# Patient Record
Sex: FEMALE | Race: WHITE | Employment: FULL TIME | URBAN - METROPOLITAN AREA
[De-identification: names, ages, dates, MRNs, and addresses within clinical notes are randomized per-mention and may not be internally consistent; named-entity substitution may affect disease eponyms.]

---

## 2018-08-03 ENCOUNTER — OFFICE VISIT (OUTPATIENT)
Dept: VASCULAR SURGERY | Age: 53
End: 2018-08-03

## 2018-08-03 VITALS
DIASTOLIC BLOOD PRESSURE: 55 MMHG | WEIGHT: 125 LBS | BODY MASS INDEX: 19.62 KG/M2 | HEIGHT: 67 IN | SYSTOLIC BLOOD PRESSURE: 88 MMHG | HEART RATE: 68 BPM

## 2018-08-03 DIAGNOSIS — I83.892 SYMPTOMATIC VARICOSE VEINS, LEFT: Primary | ICD-10-CM

## 2018-08-03 PROCEDURE — 99213 OFFICE O/P EST LOW 20 MIN: CPT | Performed by: SURGERY

## 2018-08-08 ENCOUNTER — PROCEDURE VISIT (OUTPATIENT)
Dept: VASCULAR SURGERY | Age: 53
End: 2018-08-08

## 2018-08-08 DIAGNOSIS — I83.12 VARICOSE VEINS OF LEFT LOWER EXTREMITY WITH INFLAMMATION: Primary | ICD-10-CM

## 2018-08-08 PROCEDURE — 93971 EXTREMITY STUDY: CPT | Performed by: SURGERY

## 2018-12-12 ENCOUNTER — OFFICE VISIT (OUTPATIENT)
Dept: VASCULAR SURGERY | Age: 53
End: 2018-12-12
Payer: COMMERCIAL

## 2018-12-12 ENCOUNTER — TELEPHONE (OUTPATIENT)
Dept: VASCULAR SURGERY | Age: 53
End: 2018-12-12

## 2018-12-12 VITALS
HEIGHT: 67 IN | DIASTOLIC BLOOD PRESSURE: 70 MMHG | WEIGHT: 125 LBS | BODY MASS INDEX: 19.62 KG/M2 | SYSTOLIC BLOOD PRESSURE: 106 MMHG | HEART RATE: 51 BPM

## 2018-12-12 DIAGNOSIS — I83.90 RECURRENT VARICOSE VEINS: ICD-10-CM

## 2018-12-12 DIAGNOSIS — I83.892 SYMPTOMATIC VARICOSE VEINS, LEFT: Primary | ICD-10-CM

## 2018-12-12 PROCEDURE — 99213 OFFICE O/P EST LOW 20 MIN: CPT | Performed by: SURGERY

## 2019-05-31 ENCOUNTER — TELEPHONE (OUTPATIENT)
Dept: VASCULAR SURGERY | Age: 54
End: 2019-05-31

## 2019-05-31 NOTE — TELEPHONE ENCOUNTER
Patient had a scan done in August last year and saw Dr. Juan A Ramos in December. She said she didn't know what the next steps were and no one called her. Please call her at [phone]. Thanks.   LA

## 2019-06-03 ENCOUNTER — TELEPHONE (OUTPATIENT)
Dept: VASCULAR SURGERY | Age: 54
End: 2019-06-03

## 2019-06-03 NOTE — TELEPHONE ENCOUNTER
Spoke with patient - explained there was some miscommunication and she was to F/U with office after her Pelvic U/S in 12/2018. However, she did not. I apologized that I did not follow up with her either. This was well received by the patient. Did explain that I can talk with Dr. Yumiko Hammer on Wednesday 6/5/19 and call her back to discuss next steps -possibly another VDS & Test result appointment. Also, patient asked about B/LE VDS since she is not experiencing R/LE S/S around her ankle. Will discuss w/Dr. Da Obando and call patient.   Nara Varghese 6/3/19

## 2019-08-23 ENCOUNTER — PROCEDURE VISIT (OUTPATIENT)
Dept: VASCULAR SURGERY | Age: 54
End: 2019-08-23
Payer: COMMERCIAL

## 2019-08-23 ENCOUNTER — OFFICE VISIT (OUTPATIENT)
Dept: VASCULAR SURGERY | Age: 54
End: 2019-08-23
Payer: COMMERCIAL

## 2019-08-23 VITALS
HEIGHT: 67 IN | SYSTOLIC BLOOD PRESSURE: 120 MMHG | WEIGHT: 132 LBS | BODY MASS INDEX: 20.72 KG/M2 | DIASTOLIC BLOOD PRESSURE: 85 MMHG

## 2019-08-23 VITALS — DIASTOLIC BLOOD PRESSURE: 85 MMHG | SYSTOLIC BLOOD PRESSURE: 120 MMHG | WEIGHT: 132 LBS | BODY MASS INDEX: 20.67 KG/M2

## 2019-08-23 DIAGNOSIS — I83.11 VARICOSE VEINS OF BOTH LOWER EXTREMITIES WITH INFLAMMATION: Primary | ICD-10-CM

## 2019-08-23 DIAGNOSIS — I83.12 VARICOSE VEINS OF BOTH LOWER EXTREMITIES WITH INFLAMMATION: Primary | ICD-10-CM

## 2019-08-23 DIAGNOSIS — I83.90 RECURRENT VARICOSE VEINS: ICD-10-CM

## 2019-08-23 DIAGNOSIS — I83.892 SYMPTOMATIC VARICOSE VEINS, LEFT: Primary | ICD-10-CM

## 2019-08-23 PROCEDURE — 93970 EXTREMITY STUDY: CPT | Performed by: SURGERY

## 2019-08-23 PROCEDURE — 99213 OFFICE O/P EST LOW 20 MIN: CPT | Performed by: SURGERY

## 2019-08-28 ENCOUNTER — TELEPHONE (OUTPATIENT)
Dept: VASCULAR SURGERY | Age: 54
End: 2019-08-28

## 2019-08-28 NOTE — TELEPHONE ENCOUNTER
Mailed 20-30 mmHg TH compression hose Rx out to patient today along with a list of vendors.   Explained at last OV that she may need 15-20 compression hose post op.  Irl Melcroft 8/28/19

## 2019-09-11 ENCOUNTER — TELEPHONE (OUTPATIENT)
Dept: VASCULAR SURGERY | Age: 54
End: 2019-09-11

## 2019-09-11 NOTE — TELEPHONE ENCOUNTER
9/11/19 - spoke with Montserrat @ [facility] - gave the needed info regarding compression hose wear and reflux of the L LSV @ [facility] (none). And explained that all of this information was previously submitted with original authorization request which Danette Gardiner stated she could see and did not understand why this is being requested.  Rob Stockton 9/11/19

## 2019-10-02 ENCOUNTER — TELEPHONE (OUTPATIENT)
Dept: CARDIOTHORACIC SURGERY | Age: 54
End: 2019-10-02

## 2020-01-17 ENCOUNTER — TELEPHONE (OUTPATIENT)
Dept: CARDIOTHORACIC SURGERY | Age: 55
End: 2020-01-17

## 2020-09-22 ENCOUNTER — PROCEDURE VISIT (OUTPATIENT)
Dept: VASCULAR SURGERY | Age: 55
End: 2020-09-22
Payer: COMMERCIAL

## 2020-09-22 DIAGNOSIS — I83.892 SYMPTOMATIC VARICOSE VEINS, LEFT: Primary | ICD-10-CM

## 2020-09-22 PROCEDURE — 93971 EXTREMITY STUDY: CPT | Performed by: SURGERY

## 2020-11-12 ENCOUNTER — OFFICE VISIT (OUTPATIENT)
Dept: VASCULAR SURGERY | Age: 55
End: 2020-11-12
Payer: COMMERCIAL

## 2020-11-12 VITALS
BODY MASS INDEX: 20.72 KG/M2 | DIASTOLIC BLOOD PRESSURE: 68 MMHG | HEART RATE: 46 BPM | TEMPERATURE: 98 F | HEIGHT: 67 IN | SYSTOLIC BLOOD PRESSURE: 106 MMHG | WEIGHT: 132 LBS

## 2020-11-12 PROCEDURE — 99214 OFFICE O/P EST MOD 30 MIN: CPT | Performed by: SURGERY

## 2020-11-12 ASSESSMENT — ENCOUNTER SYMPTOMS
RESPIRATORY NEGATIVE: 1
EYES NEGATIVE: 1
GASTROINTESTINAL NEGATIVE: 1
ALLERGIC/IMMUNOLOGIC NEGATIVE: 1

## 2020-11-12 NOTE — PROGRESS NOTES
Subjective:      Patient ID: Sanchez Martinez is a 48 y.o. female. HPI Long standing pt last seen in 2019. Surgeryrepeatedly recommended but pt never proceeded for multiple reasons. Returns now for reassessment - c/o L leg fatgue, heaviness and achy discomfort. Worsening. Not wearing any compression. No treatment since seen last.       History reviewed. No pertinent past medical history. Past Surgical History:   Procedure Laterality Date    CARDIAC VALVE REPLACEMENT      valve repair     SECTION       No family history on file. History reviewed. No pertinent past medical history. No Known Allergies  Current Outpatient Medications   Medication Sig Dispense Refill    bisoprolol (ZEBETA) 5 MG tablet       levothyroxine (SYNTHROID) 50 MCG tablet        No current facility-administered medications for this visit.       Social History     Socioeconomic History    Marital status:      Spouse name: Not on file    Number of children: Not on file    Years of education: Not on file    Highest education level: Not on file   Occupational History    Not on file   Social Needs    Financial resource strain: Not on file    Food insecurity     Worry: Not on file     Inability: Not on file    Transportation needs     Medical: Not on file     Non-medical: Not on file   Tobacco Use    Smoking status: Never Smoker    Smokeless tobacco: Never Used   Substance and Sexual Activity    Alcohol use: No    Drug use: Not on file    Sexual activity: Not on file   Lifestyle    Physical activity     Days per week: Not on file     Minutes per session: Not on file    Stress: Not on file   Relationships    Social connections     Talks on phone: Not on file     Gets together: Not on file     Attends Yarsani service: Not on file     Active member of club or organization: Not on file     Attends meetings of clubs or organizations: Not on file     Relationship status: Not on file    Intimate partner violence Fear of current or ex partner: Not on file     Emotionally abused: Not on file     Physically abused: Not on file     Forced sexual activity: Not on file   Other Topics Concern    Not on file   Social History Narrative    Not on file     No family history on file. Review of Systems   Constitutional: Negative. HENT: Negative. Eyes: Negative. Respiratory: Negative. Cardiovascular: Positive for palpitations and leg swelling. L/LE   Gastrointestinal: Negative. Endocrine: Negative. Genitourinary: Negative. Musculoskeletal: Negative. Allergic/Immunologic: Negative. Neurological: Negative. Hematological: Bruises/bleeds easily. Psychiatric/Behavioral: Negative. Objective:   Physical Exam   Constitutional: She is oriented to person, place, and time. She appears well-developed and well-nourished. Thin - athletic   HENT:   Head: Normocephalic and atraumatic. Eyes: Conjunctivae and EOM are normal.   Neck: Normal range of motion. Neck supple. No JVD present. No tracheal deviation present. No thyromegaly present. Cardiovascular: Normal rate, regular rhythm, normal heart sounds and intact distal pulses. Pulmonary/Chest: Effort normal and breath sounds normal.   Abdominal: Soft. Bowel sounds are normal. She exhibits no mass. Musculoskeletal:         General: No edema. Lymphadenopathy:     She has no cervical adenopathy. Neurological: She is alert and oriented to person, place, and time. Skin: Skin is warm and dry. Psychiatric: She has a normal mood and affect. Her behavior is normal. Judgment and thought content normal.   Nursing note and vitals reviewed.     R size  L size     Spider  telangiectasias       Reticular veins       Varicose   veins Groin,thigh,calf 8-10 mm     VRS 9/22/2020 No deep reflux; L SFJ reflux + BK GSV reflux (likely posterior arch remnant) + L LSV reflux    Assessment:      Chronic superficial venous insufficiency L leg with recurrent symptomatic varicose veins      Plan:      SIGNIFICANT LEFT JUNCTION & AXIAL REFLUX with LARGE RECURRENT VARICOSITIES. Surgery is recommended - R SFJ lig/divivison + L BKGSV RFA + L LSV RFA + stab phlebectomies. This was discussed in terms that the patient could understand. The risks, including bleeding, clotting, bruising, swelling, neuritis, skin dimpling, infection, pigmentation, scarring, and mortality were discussed. I answered all questions pertaining to surgery and post operative expectations related to return to work and daily activity. Time spent counseling and coordination of care: 25 minutes. More than 50% of visit was spent reviewing and discussing venous duplex scan. She will continue compression stockings and staff to contact to schedule as recommended if desired.

## 2021-01-04 ENCOUNTER — TELEPHONE (OUTPATIENT)
Dept: VASCULAR SURGERY | Age: 56
End: 2021-01-04

## 2021-02-09 ENCOUNTER — PREP FOR PROCEDURE (OUTPATIENT)
Dept: VASCULAR SURGERY | Age: 56
End: 2021-02-09

## 2021-02-09 DIAGNOSIS — I87.2 CHRONIC VENOUS INSUFFICIENCY: ICD-10-CM

## 2021-02-09 DIAGNOSIS — Z01.818 PREOP TESTING: Primary | ICD-10-CM

## 2021-02-10 RX ORDER — SODIUM CHLORIDE 0.9 % (FLUSH) 0.9 %
10 SYRINGE (ML) INJECTION PRN
Status: CANCELLED | OUTPATIENT
Start: 2021-02-10

## 2021-02-10 RX ORDER — SODIUM CHLORIDE 0.9 % (FLUSH) 0.9 %
10 SYRINGE (ML) INJECTION EVERY 12 HOURS SCHEDULED
Status: CANCELLED | OUTPATIENT
Start: 2021-02-10

## 2021-02-18 ENCOUNTER — OFFICE VISIT (OUTPATIENT)
Dept: PRIMARY CARE CLINIC | Age: 56
End: 2021-02-18
Payer: COMMERCIAL

## 2021-02-18 DIAGNOSIS — Z01.818 PREOP EXAMINATION: Primary | ICD-10-CM

## 2021-02-18 LAB — SARS-COV-2, NAA: NOT DETECTED

## 2021-02-18 PROCEDURE — 99211 OFF/OP EST MAY X REQ PHY/QHP: CPT | Performed by: NURSE PRACTITIONER

## 2021-02-18 NOTE — PROGRESS NOTES
[facility] [facility] time____0900________        Surgery time___1030_________    Take the following medications with a sip of water: Follow your MD/Surgeons pre-procedure instructions regarding your medications    Do not eat or drink anything after 12:00 midnight prior to your surgery. This includes water chewing gum, mints and ice chips. You may brush your teeth and gargle the morning of your surgery, but do not swallow the water     Please see your family doctor/pediatrician for a history and physical and/or concerning medications. Bring any test results/reports from your physicians office. If you are under the care of a heart doctor or specialist doctor, please be aware that you may be asked to them for clearance    You may be asked to stop blood thinners such as Coumadin, Plavix, Fragmin, Lovenox, etc., or any anti-inflammatories such as:  Aspirin, Ibuprofen, Advil, Naproxen prior to your surgery. We also ask that you stop any OTC medications such as fish oil, vitamin E, glucosamine, garlic, Multivitamins, COQ 10, etc.    We ask that you do not smoke 24 hours prior to surgery  We ask that you do not  drink any alcoholic beverages 24 hours prior to surgery     You must make arrangements for a responsible adult to take you home after your surgery. For your safety you will not be allowed to leave alone or drive yourself home. Your surgery will be cancelled if you do not have a ride home. Also for your safety, it is strongly suggested that someone stay with you the first 24 hours after your surgery. A parent or legal guardian must accompany a child scheduled for surgery and plan to stay at the hospital until the child is discharged. Please do not bring other children with you. For your comfort, please wear simple loose fitting clothing to the hospital.  Please do not bring valuables.     Do not wear any make-up or nail polish on your fingers or

## 2021-02-18 NOTE — PROGRESS NOTES
Phone message left on home/cell # , to call PAT dept at [phone]  for history review and surgery instructions.

## 2021-02-18 NOTE — PROGRESS NOTES

## 2021-02-18 NOTE — PROGRESS NOTES
C-Difficile admission screening and protocol:     * Admitted with diarrhea? YES____    NO__X___     *Prior history of C-Diff. In last 3 months? YES____   NO__X__     *Antibiotic use in the past 6-8 weeks? NO___X___YES______                 If yes which  ANTIBIOTIC AND REASON______     *Prior hospitalization or nursing home in the last month?  YES____   NO_X__

## 2021-02-22 ENCOUNTER — ANESTHESIA EVENT (OUTPATIENT)
Dept: OPERATING ROOM | Age: 56
End: 2021-02-22
Payer: COMMERCIAL

## 2021-02-23 ENCOUNTER — HOSPITAL ENCOUNTER (OUTPATIENT)
Age: 56
Setting detail: OUTPATIENT SURGERY
Discharge: HOME | End: 2021-02-23
Attending: SURGERY | Admitting: SURGERY
Payer: COMMERCIAL

## 2021-02-23 ENCOUNTER — ANESTHESIA (OUTPATIENT)
Dept: OPERATING ROOM | Age: 56
End: 2021-02-23
Payer: COMMERCIAL

## 2021-02-23 VITALS
BODY MASS INDEX: 20.28 KG/M2 | DIASTOLIC BLOOD PRESSURE: 60 MMHG | OXYGEN SATURATION: 99 % | RESPIRATION RATE: 16 BRPM | SYSTOLIC BLOOD PRESSURE: 121 MMHG | HEART RATE: 42 BPM | TEMPERATURE: 97.4 F | HEIGHT: 67 IN | WEIGHT: 129.19 LBS

## 2021-02-23 VITALS
TEMPERATURE: 94.3 F | DIASTOLIC BLOOD PRESSURE: 76 MMHG | OXYGEN SATURATION: 100 % | RESPIRATION RATE: 1 BRPM | SYSTOLIC BLOOD PRESSURE: 120 MMHG

## 2021-02-23 DIAGNOSIS — G89.18 POSTOPERATIVE PAIN OF EXTREMITY: Primary | ICD-10-CM

## 2021-02-23 DIAGNOSIS — M79.609 POSTOPERATIVE PAIN OF EXTREMITY: Primary | ICD-10-CM

## 2021-02-23 PROCEDURE — 7100000011 HC PHASE II RECOVERY - ADDTL 15 MIN: Performed by: SURGERY

## 2021-02-23 PROCEDURE — 6360000002 HC RX W HCPCS: Performed by: SURGERY

## 2021-02-23 PROCEDURE — 2709999900 HC NON-CHARGEABLE SUPPLY: Performed by: SURGERY

## 2021-02-23 PROCEDURE — 3600000014 HC SURGERY LEVEL 4 ADDTL 15MIN: Performed by: SURGERY

## 2021-02-23 PROCEDURE — 36475 ENDOVENOUS RF 1ST VEIN: CPT | Performed by: SURGERY

## 2021-02-23 PROCEDURE — 3700000001 HC ADD 15 MINUTES (ANESTHESIA): Performed by: SURGERY

## 2021-02-23 PROCEDURE — 37766 PHLEB VEINS - EXTREM 20+: CPT | Performed by: SURGERY

## 2021-02-23 PROCEDURE — 7100000000 HC PACU RECOVERY - FIRST 15 MIN: Performed by: SURGERY

## 2021-02-23 PROCEDURE — C1769 GUIDE WIRE: HCPCS | Performed by: SURGERY

## 2021-02-23 PROCEDURE — 3700000000 HC ANESTHESIA ATTENDED CARE: Performed by: SURGERY

## 2021-02-23 PROCEDURE — 6360000002 HC RX W HCPCS: Performed by: NURSE ANESTHETIST, CERTIFIED REGISTERED

## 2021-02-23 PROCEDURE — 2500000003 HC RX 250 WO HCPCS: Performed by: NURSE ANESTHETIST, CERTIFIED REGISTERED

## 2021-02-23 PROCEDURE — 2580000003 HC RX 258: Performed by: SURGERY

## 2021-02-23 PROCEDURE — 7100000001 HC PACU RECOVERY - ADDTL 15 MIN: Performed by: SURGERY

## 2021-02-23 PROCEDURE — 7100000010 HC PHASE II RECOVERY - FIRST 15 MIN: Performed by: SURGERY

## 2021-02-23 PROCEDURE — 2580000003 HC RX 258: Performed by: ANESTHESIOLOGY

## 2021-02-23 PROCEDURE — 6360000002 HC RX W HCPCS: Performed by: ANESTHESIOLOGY

## 2021-02-23 PROCEDURE — 37700 LIGATION&DIV LONG SAPH VEIN: CPT | Performed by: SURGERY

## 2021-02-23 PROCEDURE — 2720000010 HC SURG SUPPLY STERILE: Performed by: SURGERY

## 2021-02-23 PROCEDURE — C1894 INTRO/SHEATH, NON-LASER: HCPCS | Performed by: SURGERY

## 2021-02-23 PROCEDURE — 2500000003 HC RX 250 WO HCPCS: Performed by: SURGERY

## 2021-02-23 PROCEDURE — 3600000004 HC SURGERY LEVEL 4 BASE: Performed by: SURGERY

## 2021-02-23 RX ORDER — OXYCODONE HYDROCHLORIDE AND ACETAMINOPHEN 5; 325 MG/1; MG/1
2 TABLET ORAL PRN
Status: DISCONTINUED | OUTPATIENT
Start: 2021-02-23 | End: 2021-02-23 | Stop reason: HOSPADM

## 2021-02-23 RX ORDER — ONDANSETRON 2 MG/ML
INJECTION INTRAMUSCULAR; INTRAVENOUS PRN
Status: DISCONTINUED | OUTPATIENT
Start: 2021-02-23 | End: 2021-02-23 | Stop reason: SDUPTHER

## 2021-02-23 RX ORDER — PHENYLEPHRINE HCL IN 0.9% NACL 1 MG/10 ML
SYRINGE (ML) INTRAVENOUS PRN
Status: DISCONTINUED | OUTPATIENT
Start: 2021-02-23 | End: 2021-02-23 | Stop reason: SDUPTHER

## 2021-02-23 RX ORDER — LIDOCAINE HYDROCHLORIDE 20 MG/ML
INJECTION, SOLUTION EPIDURAL; INFILTRATION; INTRACAUDAL; PERINEURAL PRN
Status: DISCONTINUED | OUTPATIENT
Start: 2021-02-23 | End: 2021-02-23 | Stop reason: SDUPTHER

## 2021-02-23 RX ORDER — OXYCODONE HYDROCHLORIDE AND ACETAMINOPHEN 5; 325 MG/1; MG/1
1 TABLET ORAL PRN
Status: DISCONTINUED | OUTPATIENT
Start: 2021-02-23 | End: 2021-02-23 | Stop reason: HOSPADM

## 2021-02-23 RX ORDER — NEOSTIGMINE METHYLSULFATE 1 MG/ML
INJECTION, SOLUTION INTRAVENOUS PRN
Status: DISCONTINUED | OUTPATIENT
Start: 2021-02-23 | End: 2021-02-23 | Stop reason: SDUPTHER

## 2021-02-23 RX ORDER — PROPOFOL 10 MG/ML
INJECTION, EMULSION INTRAVENOUS PRN
Status: DISCONTINUED | OUTPATIENT
Start: 2021-02-23 | End: 2021-02-23 | Stop reason: SDUPTHER

## 2021-02-23 RX ORDER — ROCURONIUM BROMIDE 10 MG/ML
INJECTION, SOLUTION INTRAVENOUS PRN
Status: DISCONTINUED | OUTPATIENT
Start: 2021-02-23 | End: 2021-02-23 | Stop reason: SDUPTHER

## 2021-02-23 RX ORDER — SODIUM CHLORIDE 0.9 % (FLUSH) 0.9 %
10 SYRINGE (ML) INJECTION EVERY 12 HOURS SCHEDULED
Status: DISCONTINUED | OUTPATIENT
Start: 2021-02-23 | End: 2021-02-23 | Stop reason: HOSPADM

## 2021-02-23 RX ORDER — FENTANYL CITRATE 50 UG/ML
INJECTION, SOLUTION INTRAMUSCULAR; INTRAVENOUS PRN
Status: DISCONTINUED | OUTPATIENT
Start: 2021-02-23 | End: 2021-02-23 | Stop reason: SDUPTHER

## 2021-02-23 RX ORDER — APREPITANT 40 MG/1
40 CAPSULE ORAL ONCE
Status: COMPLETED | OUTPATIENT
Start: 2021-02-23 | End: 2021-02-23

## 2021-02-23 RX ORDER — GLYCOPYRROLATE 0.2 MG/ML
INJECTION INTRAMUSCULAR; INTRAVENOUS PRN
Status: DISCONTINUED | OUTPATIENT
Start: 2021-02-23 | End: 2021-02-23 | Stop reason: SDUPTHER

## 2021-02-23 RX ORDER — SODIUM CHLORIDE 0.9 % (FLUSH) 0.9 %
10 SYRINGE (ML) INJECTION PRN
Status: DISCONTINUED | OUTPATIENT
Start: 2021-02-23 | End: 2021-02-23 | Stop reason: HOSPADM

## 2021-02-23 RX ORDER — SODIUM CHLORIDE 9 MG/ML
INJECTION, SOLUTION INTRAVENOUS CONTINUOUS
Status: DISCONTINUED | OUTPATIENT
Start: 2021-02-23 | End: 2021-02-23 | Stop reason: HOSPADM

## 2021-02-23 RX ORDER — FENTANYL CITRATE 50 UG/ML
25 INJECTION, SOLUTION INTRAMUSCULAR; INTRAVENOUS EVERY 5 MIN PRN
Status: DISCONTINUED | OUTPATIENT
Start: 2021-02-23 | End: 2021-02-23 | Stop reason: HOSPADM

## 2021-02-23 RX ORDER — ONDANSETRON 2 MG/ML
4 INJECTION INTRAMUSCULAR; INTRAVENOUS
Status: COMPLETED | OUTPATIENT
Start: 2021-02-23 | End: 2021-02-23

## 2021-02-23 RX ORDER — MIDAZOLAM HYDROCHLORIDE 1 MG/ML
INJECTION INTRAMUSCULAR; INTRAVENOUS PRN
Status: DISCONTINUED | OUTPATIENT
Start: 2021-02-23 | End: 2021-02-23 | Stop reason: SDUPTHER

## 2021-02-23 RX ORDER — EPHEDRINE SULFATE/0.9% NACL/PF 50 MG/5 ML
SYRINGE (ML) INTRAVENOUS PRN
Status: DISCONTINUED | OUTPATIENT
Start: 2021-02-23 | End: 2021-02-23 | Stop reason: SDUPTHER

## 2021-02-23 RX ORDER — CEPHALEXIN 500 MG/1
500 CAPSULE ORAL 3 TIMES DAILY
Qty: 15 CAPSULE | Refills: 0 | Status: SHIPPED | OUTPATIENT
Start: 2021-02-23

## 2021-02-23 RX ORDER — DEXAMETHASONE SODIUM PHOSPHATE 4 MG/ML
INJECTION, SOLUTION INTRA-ARTICULAR; INTRALESIONAL; INTRAMUSCULAR; INTRAVENOUS; SOFT TISSUE PRN
Status: DISCONTINUED | OUTPATIENT
Start: 2021-02-23 | End: 2021-02-23 | Stop reason: SDUPTHER

## 2021-02-23 RX ORDER — HYDROCODONE BITARTRATE AND ACETAMINOPHEN 5; 325 MG/1; MG/1
1 TABLET ORAL EVERY 6 HOURS PRN
Qty: 20 TABLET | Refills: 0 | Status: SHIPPED | OUTPATIENT
Start: 2021-02-23 | End: 2021-02-28

## 2021-02-23 RX ADMIN — LIDOCAINE HYDROCHLORIDE 60 MG: 20 INJECTION, SOLUTION EPIDURAL; INFILTRATION; INTRACAUDAL; PERINEURAL at 11:23

## 2021-02-23 RX ADMIN — FENTANYL CITRATE 25 MCG: 50 INJECTION INTRAMUSCULAR; INTRAVENOUS at 12:29

## 2021-02-23 RX ADMIN — Medication 5 MG: at 11:41

## 2021-02-23 RX ADMIN — GLYCOPYRROLATE 0.2 MG: 0.2 INJECTION, SOLUTION INTRAMUSCULAR; INTRAVENOUS at 11:51

## 2021-02-23 RX ADMIN — ONDANSETRON 4 MG: 2 INJECTION INTRAMUSCULAR; INTRAVENOUS at 14:19

## 2021-02-23 RX ADMIN — FENTANYL CITRATE 25 MCG: 50 INJECTION INTRAMUSCULAR; INTRAVENOUS at 12:02

## 2021-02-23 RX ADMIN — APREPITANT 40 MG: 40 CAPSULE ORAL at 10:26

## 2021-02-23 RX ADMIN — Medication 5 MG: at 11:34

## 2021-02-23 RX ADMIN — ROCURONIUM BROMIDE 50 MG: 10 INJECTION INTRAVENOUS at 11:23

## 2021-02-23 RX ADMIN — DEXAMETHASONE SODIUM PHOSPHATE 6 MG: 4 INJECTION, SOLUTION INTRAMUSCULAR; INTRAVENOUS at 11:31

## 2021-02-23 RX ADMIN — CEFAZOLIN SODIUM 2000 MG: 10 INJECTION, POWDER, FOR SOLUTION INTRAVENOUS at 11:16

## 2021-02-23 RX ADMIN — Medication 5 MG: at 13:17

## 2021-02-23 RX ADMIN — SODIUM CHLORIDE: 9 INJECTION, SOLUTION INTRAVENOUS at 10:02

## 2021-02-23 RX ADMIN — PROPOFOL 150 MG: 10 INJECTION, EMULSION INTRAVENOUS at 11:23

## 2021-02-23 RX ADMIN — NEOSTIGMINE 3 MG: 1 INJECTION INTRAVENOUS at 14:22

## 2021-02-23 RX ADMIN — Medication 10 MG: at 11:45

## 2021-02-23 RX ADMIN — Medication 50 MCG: at 11:48

## 2021-02-23 RX ADMIN — FENTANYL CITRATE 50 MCG: 50 INJECTION INTRAMUSCULAR; INTRAVENOUS at 11:21

## 2021-02-23 RX ADMIN — PROPOFOL 30 MG: 10 INJECTION, EMULSION INTRAVENOUS at 12:35

## 2021-02-23 RX ADMIN — Medication 5 MG: at 11:43

## 2021-02-23 RX ADMIN — ROCURONIUM BROMIDE 20 MG: 10 INJECTION INTRAVENOUS at 12:35

## 2021-02-23 RX ADMIN — GLYCOPYRROLATE 0.4 MG: 0.2 INJECTION, SOLUTION INTRAMUSCULAR; INTRAVENOUS at 14:21

## 2021-02-23 RX ADMIN — ONDANSETRON 4 MG: 2 INJECTION INTRAMUSCULAR; INTRAVENOUS at 15:40

## 2021-02-23 RX ADMIN — FENTANYL CITRATE 25 MCG: 50 INJECTION, SOLUTION INTRAMUSCULAR; INTRAVENOUS at 15:05

## 2021-02-23 RX ADMIN — Medication 10 MG: at 13:33

## 2021-02-23 RX ADMIN — MIDAZOLAM 2 MG: 1 INJECTION INTRAMUSCULAR; INTRAVENOUS at 11:16

## 2021-02-23 RX ADMIN — Medication 10 MG: at 13:46

## 2021-02-23 ASSESSMENT — PULMONARY FUNCTION TESTS
PIF_VALUE: 17
PIF_VALUE: 18
PIF_VALUE: 2
PIF_VALUE: 17
PIF_VALUE: 17
PIF_VALUE: 18
PIF_VALUE: 17
PIF_VALUE: 18
PIF_VALUE: 16
PIF_VALUE: 17
PIF_VALUE: 2
PIF_VALUE: 18
PIF_VALUE: 17
PIF_VALUE: 18
PIF_VALUE: 17
PIF_VALUE: 18
PIF_VALUE: 17
PIF_VALUE: 0
PIF_VALUE: 17
PIF_VALUE: 14
PIF_VALUE: 18
PIF_VALUE: 17
PIF_VALUE: 18
PIF_VALUE: 14
PIF_VALUE: 16
PIF_VALUE: 18
PIF_VALUE: 17
PIF_VALUE: 13
PIF_VALUE: 13
PIF_VALUE: 14
PIF_VALUE: 17
PIF_VALUE: 16
PIF_VALUE: 17
PIF_VALUE: 18
PIF_VALUE: 2
PIF_VALUE: 17
PIF_VALUE: 16
PIF_VALUE: 1
PIF_VALUE: 17
PIF_VALUE: 13
PIF_VALUE: 17
PIF_VALUE: 3
PIF_VALUE: 0
PIF_VALUE: 14
PIF_VALUE: 17
PIF_VALUE: 19
PIF_VALUE: 18
PIF_VALUE: 14
PIF_VALUE: 18
PIF_VALUE: 16
PIF_VALUE: 14
PIF_VALUE: 17
PIF_VALUE: 1
PIF_VALUE: 1
PIF_VALUE: 18
PIF_VALUE: 17
PIF_VALUE: 17
PIF_VALUE: 14
PIF_VALUE: 18
PIF_VALUE: 17
PIF_VALUE: 1
PIF_VALUE: 18
PIF_VALUE: 2
PIF_VALUE: 17
PIF_VALUE: 17
PIF_VALUE: 3
PIF_VALUE: 13
PIF_VALUE: 14
PIF_VALUE: 16
PIF_VALUE: 14
PIF_VALUE: 13
PIF_VALUE: 17
PIF_VALUE: 1
PIF_VALUE: 14
PIF_VALUE: 17
PIF_VALUE: 18
PIF_VALUE: 14
PIF_VALUE: 17
PIF_VALUE: 14
PIF_VALUE: 18
PIF_VALUE: 17
PIF_VALUE: 13
PIF_VALUE: 17
PIF_VALUE: 14
PIF_VALUE: 24
PIF_VALUE: 0
PIF_VALUE: 16
PIF_VALUE: 17

## 2021-02-23 ASSESSMENT — PAIN SCALES - GENERAL
PAINLEVEL_OUTOF10: 6
PAINLEVEL_OUTOF10: 3
PAINLEVEL_OUTOF10: 3
PAINLEVEL_OUTOF10: 5

## 2021-02-23 ASSESSMENT — PAIN DESCRIPTION - ORIENTATION
ORIENTATION: LEFT
ORIENTATION: LEFT

## 2021-02-23 ASSESSMENT — PAIN - FUNCTIONAL ASSESSMENT
PAIN_FUNCTIONAL_ASSESSMENT: ACTIVITIES ARE NOT PREVENTED
PAIN_FUNCTIONAL_ASSESSMENT: ACTIVITIES ARE NOT PREVENTED
PAIN_FUNCTIONAL_ASSESSMENT: 0-10

## 2021-02-23 ASSESSMENT — PAIN DESCRIPTION - PROGRESSION
CLINICAL_PROGRESSION: GRADUALLY IMPROVING
CLINICAL_PROGRESSION: NOT CHANGED

## 2021-02-23 ASSESSMENT — PAIN DESCRIPTION - DESCRIPTORS
DESCRIPTORS: BURNING

## 2021-02-23 ASSESSMENT — PAIN DESCRIPTION - LOCATION
LOCATION: GROIN;LEG
LOCATION: LEG
LOCATION: LEG
LOCATION: GROIN

## 2021-02-23 ASSESSMENT — PAIN DESCRIPTION - ONSET
ONSET: ON-GOING
ONSET: ON-GOING

## 2021-02-23 ASSESSMENT — PAIN DESCRIPTION - PAIN TYPE: TYPE: SURGICAL PAIN

## 2021-02-23 ASSESSMENT — PAIN DESCRIPTION - FREQUENCY: FREQUENCY: CONTINUOUS

## 2021-02-23 NOTE — H&P
Update History & Physical    The patient's History and Physical of February 11, 2021 was reviewed with the patient and I examined the patient. There was no change. The surgical site was confirmed by the patient and me. Plan: The risks, benefits, expected outcome, and alternative to the recommended procedure have been discussed with the patient. Patient understands and wants to proceed with the procedure.      Electronically signed by Osvaldo Peraza MD on 2/23/2021 at 11:06 AM

## 2021-02-23 NOTE — ANESTHESIA POSTPROCEDURE EVALUATION
Department of Anesthesiology  Postprocedure Note    Patient: Molly Patel  MRN: [id number]  YOB: 1965  Date of evaluation: 2/23/2021  Time:  3:40 PM     Procedure Summary     Date: 02/23/21 Room / Location: [address]    Anesthesia Start: 1118 Anesthesia Stop: 1445    Procedures:       LEFT LIGATION SAPHENFEMORAL LIGATION (Right Groin)      LEFT BELOW KNEE ENDOVENOUS RADIOFREQUENCY ABLATION OF GREATER SAPHENOUS VEIN, LEFT ENDOVENOUS RADIOFREQUENCY ABLATION LESSER SAPHENOUS VEIN, STAB PHLEBECTOMIES (Left Leg Lower) Diagnosis:       Symptomatic varicose veins, bilateral      (symptomatic varicose veins, recurrent varicose veins)    Surgeons: Cliff Tran MD Responsible Provider: Pablito Steele MD    Anesthesia Type: general ASA Status: 3          Anesthesia Type: general    Marcos Phase I: Marcos Score: 10    Marcos Phase II:      Last vitals: Reviewed and per EMR flowsheets.        Anesthesia Post Evaluation    Patient location during evaluation: bedside  Patient participation: complete - patient participated  Level of consciousness: awake  Pain score: 3  Airway patency: patent  Nausea & Vomiting: no nausea and no vomiting  Complications: no  Cardiovascular status: blood pressure returned to baseline  Respiratory status: acceptable  Hydration status: euvolemic

## 2021-02-23 NOTE — PROGRESS NOTES
Pt to PACU. A&Ox4. VSS, HR running low 40's which is her baseline. LLE dressing D&I. Normal sensation, brisk cap refill, normal distal pulses. Left groin dressing D&I. IV site WDL.

## 2021-02-23 NOTE — BRIEF OP NOTE
Brief Postoperative Note      Patient: Kiersten Sage  YOB: 1965  MRN: [id number]    Date of Procedure: 2/23/2021    Pre-Op Diagnosis: symptomatic varicose veins, recurrent varicose veins    Post-Op Diagnosis: Same       Procedure: Ligation/division L SFJ; L LSV RFA; stab phlebectomies L leg    Surgeon(s):  Valentina Henry MD    Assistant:  Surgical Assistant: Patricia Britt    Anesthesia: General    Estimated Blood Loss (mL): less than 50     Complications: None    Specimens:   * No specimens in log *    Implants:  * No implants in log *      Drains: * No LDAs found *    Findings: as above    Electronically signed by Valentina Henry MD on 2/23/2021 at 2:48 PM

## 2021-02-23 NOTE — ANESTHESIA PRE PROCEDURE
BMP  No results found for: NA, K, CL, CO2, BUN, CREATININE, CALCIUM, GFRAA, LABGLOM, GLUCOSE  POCGlucose  No results for input(s): GLUCOSE in the last 72 hours. Coags  No results found for: PROTIME, INR, APTT  HCG (If Applicable) No results found for: PREGTESTUR, PREGSERUM, HCG, HCGQUANT   ABGs No results found for: PHART, PO2ART, FWE6JML, USU8IQD, BEART, R3TTYUNM   Type & Screen (If Applicable)  No results found for: LABABO, LABRH                         BMI: Wt Readings from Last 3 Encounters:       NPO Status:   Date of last liquid consumption: 02/22/21   Time of last liquid consumption: 0000   Date of last solid food consumption: 02/22/21      Time of last solid consumption: 0000       Anesthesia Evaluation  Patient summary reviewed   history of anesthetic complications: PONV. Airway: Mallampati: I        Dental:          Pulmonary:       (-) pneumonia                           Cardiovascular:    (+) hypertension:, valvular problems/murmurs (mitral valve repair):,     (-) past MI                Neuro/Psych:      (-) seizures and CVA           GI/Hepatic/Renal:        (-) liver disease, no renal disease, bowel prep and no morbid obesity       Endo/Other:    (+) hypothyroidism::., .    (-) diabetes mellitus, hyperthyroidism               Abdominal:           Vascular:   + PVD, aortic or cerebral, . Anesthesia Plan      general     ASA 3       Induction: intravenous. MIPS: Prophylactic antiemetics administered. Anesthetic plan and risks discussed with patient. Plan discussed with CRNA.     Attending anesthesiologist reviewed and agrees with Pre Eval content              This pre-anesthesia assessment may be used as a history and physical.    DOS STAFF ADDENDUM: Pt seen and examined, chart reviewed (including anesthesia, drug and allergy history). No interval changes to history and physical examination. Anesthetic plan, risks, benefits, alternatives, and personnel involved discussed with patient. Patient verbalized an understanding and agrees to proceed.       Nissa Lemus MD  February 23, 2021  10:09 AM

## 2021-02-23 NOTE — PROGRESS NOTES
VSS. Pt states pain \"better and definitely tolerable now\" 3/10 in left groin and LLE. Dressings remain D&I. Normal distal pulses, brisk cap refill, normal sensation. IV site WDL.

## 2021-02-23 NOTE — PROGRESS NOTES
From PACU. Alert and oriented. C/o 3/10 surgical left leg pain and nausea. Vss. Dressing is clean dry intact. Toes are warm, move well, osbaldo well.

## 2021-02-23 NOTE — PROGRESS NOTES
Vss. Dressing remains clean dry intact. Toes are warm, move well, osbaldo well. When asked says she has 3/10 surgical discomfort that is tolerable. Has taken sips of ginger ale and nibbles of saltines. Says nausea is slightly improved. Son at bedside. Verbalized understanding of discharge instructions.

## 2021-02-23 NOTE — PROGRESS NOTES
Tolerated sitting up and sips of ginger ale and bites of crackers. Says nausea is slowly improving and pain is tolerable. additional anti nausea medication was offered but patient refused. Says she just wants to go home and go to bed.

## 2021-02-24 NOTE — OP NOTE
[address] [facility] [place] [facility]                                OPERATIVE REPORT    PATIENT NAME: Reed Mir                   :        1965  MED REC NO:   [id number]                          ROOM:  ACCOUNT NO:   [de-identified]                           ADMIT DATE: 2021  PROVIDER:     Nanette Sosa MD    DATE OF PROCEDURE:  2021    PREOPERATIVE DIAGNOSIS:  Chronic superficial venous insufficiency Left leg  with painful symptomatic recurrent varicose veins. POSTOPERATIVE DIAGNOSIS: Chronic superficial venous insufficiency Left leg  with painful symptomatic recurrent varicose veins. OPERATION PERFORMED:  1. Ligation and division, left saphenofemoral junction. 2.  Radiofrequency ablation, left lesser saphenous vein. 3.  Stab phlebectomies, left leg (total incisions 30). SURGEON:  Nanette Sosa MD    ANESTHESIA:  General endotracheal.    ESTIMATED BLOOD LOSS:  Less than 50 mL. INDICATIONS:  The patient is a 81-year-old lady who has had previous  varicose vein surgery approximately 15 years ago and has had the  development of large recurrent symptomatic varicosities. Duplex  scanning demonstrated a patent saphenofemoral junction with reflux  extending out through large branches into the perineum where she had  labial varicosities as well as down into the thigh. She was also noted  to have reflux in the left lesser saphenous vein and in the posterior  arch of the residual greater saphenous vein. It was recommended that  she undergo the above-stated procedure, and she agreed understanding the  risks, benefits, and other options. OPERATIVE PROCEDURE:  The patient was brought to the operating room and  placed on the operating table in supine position.   After adequate  induction of anesthesia, the left groin and leg were prepped and draped in a sterile fashion. Varicosities had been marked preoperatively in  the holding area while the patient was standing using indelible ink. The procedure was begun by identifying the lesser saphenous vein with  the leg externally rotated using ultrasound. With the patient in  reverse Trendelenburg, the vein was punctured using ultrasound guidance  in a micropuncture technique. This was upsized to a standard 7-Australian  sheath. Through the sheath was then placed a ClosureFast catheter,  which was advanced proximally to the level of the saphenopopliteal  junction. Under ultrasound guidance, it was then withdrawn to  approximately the level of the origin of the Giacomini vein. The  catheter was locked in position. Attention was directed to the left groin where an oblique incision was  made through the previous old surgical scar. Subcutaneous tissue was  divided and several large varicosities were identified. These were  carefully dissected back to their origin off the stump of the  saphenofemoral junction, which measured approximately 1.5 cm in length. This was carefully mobilized proximally down to its origin off the  common femoral vein through the foramen ovale. A large peroneal branch  was identified and was divided between 2-0 silk ligatures as well as a  large lateral branch similarly divided between 2-0 silk ligatures. After being mobilized, the saphenofemoral junction was divided over a  clamp and oversewn with a 2-0 silk suture flush with the common femoral  vein. The distal residual stump was also oversewn with a 2-0 silk  suture. Eventually, that incision was closed using 3-0 Vicryl deep in  two layers and a subcuticular stitch of 4-0 Vicryl with Dermabond. The varicosities of the left leg were then removed using a stab  phlebectomy technique with Oesch hooks. A total of 30 incisions were  made.   The most proximal incisions on the labia and into the groin were closed using interrupted 3-0 chromic sutures. The remaining punctures  were eventually closed with Steri-Strips only. Tumescent fluid was then  injected along the course of the left lesser saphenous vein using  ultrasound guidance. After completion of this, the patient was placed in Trendelenburg and  the vein was ablated using a standard pullback technique, heating the  vein to 120-degree centigrade throughout its course. The catheter and  sheath were removed. After completion of Steri-Striping of all  punctures, a clean sterile dry compressive dressing was applied to the  left leg, and the patient was extubated and transferred to recovery room  in stable condition having tolerated the procedure well.         Tito Schaumann, MD    D: 02/23/2021 15:09:41       T: 02/23/2021 16:18:17     [id number]  Job#: [id number]     Doc#: [id number]    CC:

## 2021-02-25 ENCOUNTER — OFFICE VISIT (OUTPATIENT)
Dept: VASCULAR SURGERY | Age: 56
End: 2021-02-25

## 2021-02-25 ENCOUNTER — PROCEDURE VISIT (OUTPATIENT)
Dept: VASCULAR SURGERY | Age: 56
End: 2021-02-25
Payer: COMMERCIAL

## 2021-02-25 VITALS
HEIGHT: 67 IN | SYSTOLIC BLOOD PRESSURE: 102 MMHG | WEIGHT: 129 LBS | BODY MASS INDEX: 20.25 KG/M2 | HEART RATE: 56 BPM | DIASTOLIC BLOOD PRESSURE: 63 MMHG

## 2021-02-25 DIAGNOSIS — I83.892 SYMPTOMATIC VARICOSE VEINS, LEFT: Primary | ICD-10-CM

## 2021-02-25 DIAGNOSIS — I83.90 RECURRENT VARICOSE VEINS: ICD-10-CM

## 2021-02-25 DIAGNOSIS — I83.892 SYMPTOMATIC VARICOSE VEINS OF LEFT LOWER EXTREMITY: ICD-10-CM

## 2021-02-25 PROCEDURE — 93971 EXTREMITY STUDY: CPT | Performed by: SURGERY

## 2021-02-25 PROCEDURE — 99024 POSTOP FOLLOW-UP VISIT: CPT | Performed by: SURGERY

## 2021-03-11 ENCOUNTER — OFFICE VISIT (OUTPATIENT)
Dept: VASCULAR SURGERY | Age: 56
End: 2021-03-11

## 2021-03-11 ENCOUNTER — TELEPHONE (OUTPATIENT)
Dept: VASCULAR SURGERY | Age: 56
End: 2021-03-11

## 2021-03-11 VITALS
HEIGHT: 67 IN | HEART RATE: 52 BPM | WEIGHT: 129 LBS | DIASTOLIC BLOOD PRESSURE: 75 MMHG | BODY MASS INDEX: 20.25 KG/M2 | SYSTOLIC BLOOD PRESSURE: 111 MMHG

## 2021-03-11 DIAGNOSIS — I83.90 RECURRENT VARICOSE VEINS: ICD-10-CM

## 2021-03-11 DIAGNOSIS — I83.892 SYMPTOMATIC VARICOSE VEINS, LEFT: Primary | ICD-10-CM

## 2021-03-11 PROCEDURE — 99024 POSTOP FOLLOW-UP VISIT: CPT | Performed by: SURGERY

## 2021-03-11 NOTE — PROGRESS NOTES
[facility]         Post-op Check/Notes  Date: [unfilled]   Name: Rocío Bell  [x]  RE-WRAP [x] 2 WK POST-OP []OTHER      SURGEON gcz   DAYS POST-OP    SURG. DATE     ANESTHESIA: []  GENERAL [] EPIDURAL  HISTORY:   [x]  PATIENT IS AMBULATORY  [x]  PATIENT HAS NO COMPLAINTS AND INDICATES THAT HE/SHE IS DOING FINE  []  PATIENT EXPRESSED SOME DIFFICULTIES WITH:   [] PAIN MEDICATION:              []  THE MEDICATION WAS INTOLERABLE        []  THE MEDICATION WAS NOT EFFECTIVE        [] THE PATIENT WAS GIVEN A NEW RX FOR:              [] THE PATIENT DECIDED TO TOLERATE PAIN WITHOUT MEDICATION    [] POST OP NAUSEA        []  THE PATIENT WAS GIVEN RX FOR:                  []  THE PATIENT WAS ADVISED:                  []  OTHER:               ON 2 WEEK POST-OP CHECK  [x]  PRE-OP LEG PAIN IMPROVED  []  PRE-OP LEG PAIN UNCHANGED    PHYSICAL EXAMINATION  [x]  INCISIONS ARE APPROXIMATED WITHOUT SIGNS OF INFECTION  []  INFECTION NOTED DURING EXAM    ECCHYMOSIS   SWELLING        LOCATION:       []  MINIMAL    []  MINIMAL        []  ANTIBIOTICS GIVEN:      [x]  MODERATE thigh   []  MODERATE  []  RESIDUAL VARICOSITIES     []  SIGNIFICANT   []  SIGNIFICANT       LOCATION:       []  RESOLVED   [x]  RESOLVED  [x]  PARATHESIAS/COMMENTS: None reported             COMMENTS/NOTES:  Looks good. FOLLOW-UP:  [x]  POST-OP INSTRUCTIONS REVIEWED WITH THE PATIENT AND QUESTIONS ANSWERRED  [x]  ROUTINE WITH OPERATING PHYSICIAN  4-6 weeks   [] PRN FOR SCLEROTHERAPY  []  PRN FOR Marlyn Kayser /VEIN [facility]    [] Joshua Marrufo  []  PRN  []  OTHER:                  XXX I recommended wearing 15/20 mmHg pantyhose until ecchymosis resolved. May resume all activities including exercise but nothing over 25# then at 2 weeks no restriction. SPF 27 when in sun in [place]. F/U 4-6 weeks. Answered all questions.     Inna Lira

## 2021-05-11 NOTE — TELEPHONE ENCOUNTER
Spoke with patient. She is doing great. Does not wish to schedule at this time.  Will call if needs an appointment

## 2021-06-02 ENCOUNTER — CLINICAL DOCUMENTATION (OUTPATIENT)
Dept: OTHER | Age: 56
End: 2021-06-02

## 2021-12-15 ENCOUNTER — CLINICAL DOCUMENTATION (OUTPATIENT)
Dept: OTHER | Age: 56
End: 2021-12-15

## (undated) DEVICE — MAJOR VASCULAR: Brand: MEDLINE INDUSTRIES, INC.

## (undated) DEVICE — SUTURE PERMAHAND SZ 2-0 L12X18IN NONABSORBABLE BLK SILK A185H

## (undated) DEVICE — STOCKINETTE,IMPERVIOUS,12X48,STERILE: Brand: MEDLINE

## (undated) DEVICE — SUTURE VCRL SZ 4-0 L27IN ABSRB UD L19MM FS-2 3/8 CIR REV J422H

## (undated) DEVICE — STRIP,CLOSURE,WOUND,MEDI-STRIP,1/2X4: Brand: MEDLINE

## (undated) DEVICE — GLOVE SURG SZ 7 L12IN FNGR THK87MIL WHT LTX FREE

## (undated) DEVICE — TOWEL,OR,DSP,ST,BLUE,STD,4/PK,20PK/CS: Brand: MEDLINE

## (undated) DEVICE — SOLUTION IV IRRIG POUR BRL 0.9% SODIUM CHL 2F7124

## (undated) DEVICE — DUAL HOSE W/CPC CONNECTORS: Brand: A.T.S.® TOURNIQUET SYSTEM

## (undated) DEVICE — GAUZE,SPONGE,4"X4",16PLY,XRAY,STRL,LF: Brand: MEDLINE

## (undated) DEVICE — SUTURE PERMA-HAND SZ 2-0 L30IN NONABSORBABLE BLK L26MM SH K833H

## (undated) DEVICE — SUTURE PERMAHAND SZ 2-0 L18IN NONABSORBABLE BLK L26MM SH C012D

## (undated) DEVICE — SET INTRO SHTH MIC L7CM DIA7FR 0.018IN NDL L2.75IN DIA21GA

## (undated) DEVICE — SUTURE VCRL SZ 3-0 L27IN ABSRB UD L36MM CT-1 1/2 CIR J258H

## (undated) DEVICE — BANDAGE,GAUZE,BULKEE II,4.5"X4.1YD,STRL: Brand: MEDLINE

## (undated) DEVICE — BANDAGE COMPR W6INXL10YD ST M E WHITE/BEIGE

## (undated) DEVICE — SPONGE GZ W4XL4IN COT 12 PLY TYP VII WVN C FLD DSGN

## (undated) DEVICE — KNIFE OPHTH 25MM 55DEG BVL UP ANG SFTY CATRCT XSTAR

## (undated) DEVICE — GLOVE SURG SZ 85 CRM LTX FREE POLYISOPRENE POLYMER BEAD ANTI

## (undated) DEVICE — APPLIER LIG CLP M L11IN TI STR RNG HNDL FOR 20 CLP DISP

## (undated) DEVICE — SUTURE PERMAHAND SZ 4-0 L18IN NONABSORBABLE BLK SILK BRAID A183H

## (undated) DEVICE — SHEET,DRAPE,53X77,STERILE: Brand: MEDLINE

## (undated) DEVICE — COVER US PRB W13XL244CM SURGICAL INTRAOPERATIVE W RUBBERBAND

## (undated) DEVICE — SHEET, T, LAPAROTOMY, STERILE: Brand: MEDLINE

## (undated) DEVICE — CATHETER ABLAT 7FR L60CM HEAT ELEMENT L7CM 0.025IN

## (undated) DEVICE — GUIDEWIRE VASC L150CM DIA0.025IN PTFE DBL END FIX COR

## (undated) DEVICE — GAUZE FLUFF 2 PLY: Brand: DEROYAL

## (undated) DEVICE — APPLIER CLP L9.375IN APER 2.1MM CLS L3.8MM 20 SM TI CLP

## (undated) DEVICE — INTRODUCER CATH MIC L45CM DIA4FR TIP L7CM B BVL S STL WIRE

## (undated) DEVICE — Z CONVERTED USE 2271377 BANDAGE COMPR W6INXL5YD EC E REB

## (undated) DEVICE — ZIMMER® STERILE DISPOSABLE TOURNIQUET CUFF WITH PLC, DUAL PORT, SINGLE BLADDER, 34 IN. (86 CM)

## (undated) DEVICE — SET ADMIN NEEDLESS Y SITE RLER CLMP M SPIN LOK 10 GTT LEN

## (undated) DEVICE — KIT MICROINTRODUCER 4FR ECHOGENIC NDL L7CM 21GA STIFF COAX

## (undated) DEVICE — SUTURE NONABSORBABLE MONOFILAMENT 5-0 C-1 4X36 IN PROLENE M8720